# Patient Record
Sex: FEMALE | Race: WHITE | Employment: STUDENT | URBAN - METROPOLITAN AREA
[De-identification: names, ages, dates, MRNs, and addresses within clinical notes are randomized per-mention and may not be internally consistent; named-entity substitution may affect disease eponyms.]

---
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
## 2022-09-12 ENCOUNTER — OFFICE VISIT (OUTPATIENT)
Dept: ORTHOPEDIC SURGERY | Age: 22
End: 2022-09-12
Payer: COMMERCIAL

## 2022-09-12 VITALS — BODY MASS INDEX: 19.26 KG/M2 | HEIGHT: 69 IN | RESPIRATION RATE: 14 BRPM | WEIGHT: 130 LBS

## 2022-09-12 DIAGNOSIS — M25.572 LEFT ANKLE PAIN, UNSPECIFIED CHRONICITY: Primary | ICD-10-CM

## 2022-09-12 DIAGNOSIS — S93.492A SPRAIN OF ANTERIOR TALOFIBULAR LIGAMENT OF LEFT ANKLE, INITIAL ENCOUNTER: ICD-10-CM

## 2022-09-12 PROCEDURE — L1902 AFO ANKLE GAUNTLET PRE OTS: HCPCS | Performed by: PHYSICIAN ASSISTANT

## 2022-09-12 PROCEDURE — G8427 DOCREV CUR MEDS BY ELIG CLIN: HCPCS | Performed by: PHYSICIAN ASSISTANT

## 2022-09-12 PROCEDURE — G8420 CALC BMI NORM PARAMETERS: HCPCS | Performed by: PHYSICIAN ASSISTANT

## 2022-09-12 PROCEDURE — 99204 OFFICE O/P NEW MOD 45 MIN: CPT | Performed by: PHYSICIAN ASSISTANT

## 2022-09-12 PROCEDURE — 1036F TOBACCO NON-USER: CPT | Performed by: PHYSICIAN ASSISTANT

## 2022-09-13 NOTE — PROGRESS NOTES
Date:  2022    Name:  Cheng Newton  Address:   [place]  [address]    :  2000      Age:   23 y.o.    SSN:  [id number]      Medical Record Number:  [id number]    Reason for Visit:    Chief Complaint    Ankle Pain (Left)      DOS:2022     HPI: Cheng Newton is a 23 y.o. female presents to the [facility] after-hours clinic today for evaluation of left ankle injury. Patient states that 2 weeks ago she was at an event wearing heels and thinks she may have rolled her ankle. Patient states that she noticed some swelling and bruising on the lateral aspect of her elbow and she has had some discomfort ever since. Her discomfort has persisted and she is here today for evaluation. 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 Pain Assessment  Location of Pain: Ankle  Location Modifiers: Left, Lateral  Severity of Pain: 6  Quality of Pain: Sharp, Dull, Aching  Duration of Pain: Persistent  Frequency of Pain: Intermittent  Date Pain First Started: 22  Aggravating Factors: Stairs, Walking, Standing, Squatting  Result of Injury: Yes  Work-Related Injury: No  Are there other pain locations you wish to document?: No  ROS: Review of systems reviewed from Patient History Form completed today and available in the patient's chart under the Media tab. Past Medical History:   Diagnosis Date    Fractures     Headache     hospitalized once for DHEA tx        No past surgical history on file.     Family History   Problem Relation Age of Onset    Cancer Maternal Grandmother         Skin cancer     Cancer Maternal Grandfather         Skin cancer    Elevated Lipids Maternal Grandfather     Diabetes Paternal Grandmother     Other Brother         Marjorie-Danlos Syndrome       Social History     Socioeconomic History    Marital status: Single     Spouse name: None    Number of children: None    Years of education: None    Highest education level: None   Tobacco Use    Smoking status: Never    Smokeless tobacco: Never   Vaping Use    Vaping Use: Never used   Substance and Sexual Activity    Alcohol use: Never    Drug use: No    Sexual activity: Never       Current Outpatient Medications   Medication Sig Dispense Refill    dexamethasone (DECADRON) 4 MG/ML injection Apply 1.3-1.5 ML transdermally every 24-48 hours as directed by physical therapist for iontophoresis treatment. 10 mL 0    celecoxib (CELEBREX) 200 MG capsule Take 1 capsule by mouth daily 30 capsule 3    meloxicam (MOBIC) 15 MG tablet Take 1 tablet by mouth daily 30 tablet 3    methylPREDNISolone (MEDROL DOSEPACK) 4 MG tablet Take by mouth as directed. (Patient not taking: Reported on 9/12/2022) 21 kit 0     No current facility-administered medications for this visit. Allergies   Allergen Reactions    Topamax [Topiramate]     Amoxicillin Rash     At 25 mo old       Vital signs:  Resp 14   Ht 5' 9\" (1.753 m)   Wt 130 lb (59 kg)   BMI 19.20 kg/m²        Left Ankle Exam    Gait: Patient ambulates with a mildly antalgic gait. Appearance: Swelling present over the lateral aspect of the ankle  Stability: 1+ anterior drawer sign. Palpation: tenderness is present over the ATFL. No syndesmotic tenderness. Range of Motion: Mild restriction of wrist flexion and plantarflexion secondary to pain. Strength: Mild eversion muscle weakness. Neurovascular: Skin warm well perfused. Sensation intact to light touch. No skin lesions/abrasions. Additional findings: No peroneal tendon subluxation appreciated. Diagnostics:  Radiology:       Pertinent imaging was obtained, interpreted, and reviewed with the patient today, images only - no report available. Left ankle x-ray:    AP, lateral and mortise views were obtained and reviewed of the left ankle. Impression: No acute fracture or dislocation. No osseous abnormalities. There is no appreciable soft tissue swelling or joint effusion. There are no lytic or blastic lesions. Office Procedures:  Orders Placed This Encounter   Procedures    XR ANKLE LEFT (MIN 3 VIEWS)     Standing Status:   Future     Number of Occurrences:   1     Standing Expiration Date:   10/12/2022    Marcie Ankle Brace     Patient was prescribed a Marcie Ankle Brace. The left ankle will require stabilization / immobilization from this semi-rigid / rigid orthosis to improve their function. The orthosis will assist in protecting the affected area, provide functional support and facilitate healing. Patient was instructed to progress ambulation weight bearing as tolerated in the device. The patient was educated and fit by a healthcare professional with expert knowledge and specialization in brace application while under the direct supervision of the treating physician. Verbal and written instructions for the use of and application of this item were provided. They were instructed to contact the office immediately should the brace result in increased pain, decreased sensation, increased swelling or worsening of the condition. Assessment: 23 y.o. female with left lateral ankle sprain    Plan: Pertinent imaging was reviewed. The etiology, natural history, and treatment options for the disorder were discussed. The roles of activity medication, antiinflammatories, injections, bracing, physical therapy, and surgical interventions were all described to the patient and questions were answered. Patient suffered an injury approximately 2 weeks ago. On exam she is still quite lax and has some bruising on the lateral aspect of her ankle. At this time she is candidate for brace physical therapy exercises and follow-up with Dr. Petrona Amos. Patient understands and would like to proceed with this. .     Cloria Goltz is in agreement with this plan. All questions were answered to patient's satisfaction and was encouraged to call with any further questions.      Total time spent for evaluation, education, and development of treatment plan: 45 minutes    Ozzy Sigala, [place]  9/13/2022    This dictation was performed with a verbal recognition program (DRAGON) and it was checked for errors. It is possible that there are still dictated errors within this office note. If so, please bring any areas to my attention for an addendum. All efforts were made to ensure that this office note is accurate.